# Patient Record
Sex: FEMALE
[De-identification: names, ages, dates, MRNs, and addresses within clinical notes are randomized per-mention and may not be internally consistent; named-entity substitution may affect disease eponyms.]

---

## 2019-12-22 ENCOUNTER — HOSPITAL ENCOUNTER (EMERGENCY)
Dept: HOSPITAL 39 - ER | Age: 59
End: 2019-12-22
Payer: SELF-PAY

## 2019-12-22 VITALS — TEMPERATURE: 95.9 F

## 2019-12-22 DIAGNOSIS — E11.9: ICD-10-CM

## 2019-12-22 DIAGNOSIS — I10: ICD-10-CM

## 2019-12-22 DIAGNOSIS — I46.9: Primary | ICD-10-CM

## 2019-12-22 NOTE — ED.PDOC
History of Present Illness





- General


Chief Complaint: Cardiac Respiratory Arrest


Stated Complaint: CPR


Time Seen by Provider: 19 09:35


Source: police, EMS


Exam Limitations: clinical condition





- History of Present Illness


Initial Comments: 





Pt is a 58 yo female who presents in cardiac arrest with CPR in progress via 

EMS.  Per EMS, they were called at 0900 for unresponsive person.  Upon arrival, 

pt in asystole and CPR initiated.  Pt intubated and given epi x 2 in route.


Allergies/Adverse Reactions: 


Allergies





UNOBTAINABLE Allergy (Verified 19 09:44)


   








Review of Systems





- Review of Systems


Unable to Obtain Due To: clinical condition, other - CPR in progress





Family Medical History





- Family History


  ** Mother


Family History: Unknown


Living Status: Unknown





Physical Exam





- Physical Exam


General Appearance: Other - Intubated, CPR in progress.  Pt makes no purposeful 

movements.


Eye Exam: bilateral other - Pupils fixed and dilated


Ears, Nose, Throat: other - head is atraumatic, ET tube in place


Neck: other - trachea is midline


Respiratory: other - No breath sounds audible.  Air entry heard over epigastric 

area.  ET tube pulled and BV mask ventilation started


Cardiovascular/Chest: other - No pulse.  No cardiac sounds heard


Gastrointestinal/Abdominal: other - distended abdomen with air entry heard in 

epigastrium


Extremity: other - atraumatic


Neurologic: other - No movement or speech





Progress





- Progress


Progress: 





19 09:51


Pt presents in cardiopulmonary arrest with CPR in progress.  ET tube not in 

appropriate position clinically(no breath sounds) and removed upon arrival and 

mask venitalated.  i attempted intubation with glidescope x 1 unsuccessfully and

continued bag mask ventillation.  Given total of 5 mg Epi and pt remained in 

asystole.  Cardiac US showed no wall motion.  Code called at 0942.





i informed patients daughter who was in waiting room and answered all questions.

 


19 11:32


Multiple family members at bedside.  CAMILA called and present in ED.  Family 

deciding on  arrangements





Procedures





- Intubation


Time of Intubation: 09:30 - Unsuccessful attempt x 1.  Continued to BV mask 

ventilate


Intubation Method: orotracheal


Tube Size (cm): 7.5


Intubation Complications: oral-unsuccessful attempt





- Additional Procedures


Additional Procedures: CPR - CPR in progress upon arrival and continue in ED.  

See ACLS sheet for full details.  Cardiac bedside US performed at 0940 and 

showed no wall motion





Departure





- Departure


Clinical Impression: 


 Cardiac arrest, Hypertension, Diabetes mellitus





Time of Disposition: 09:55


Disposition: